# Patient Record
Sex: FEMALE
[De-identification: names, ages, dates, MRNs, and addresses within clinical notes are randomized per-mention and may not be internally consistent; named-entity substitution may affect disease eponyms.]

---

## 2019-03-19 ENCOUNTER — TRANSCRIPTION ENCOUNTER (OUTPATIENT)
Age: 64
End: 2019-03-19

## 2019-05-29 PROBLEM — Z00.00 ENCOUNTER FOR PREVENTIVE HEALTH EXAMINATION: Status: ACTIVE | Noted: 2019-05-29

## 2019-05-30 ENCOUNTER — APPOINTMENT (OUTPATIENT)
Dept: OTOLARYNGOLOGY | Facility: CLINIC | Age: 64
End: 2019-05-30
Payer: COMMERCIAL

## 2019-05-30 VITALS
HEART RATE: 74 BPM | BODY MASS INDEX: 23.04 KG/M2 | HEIGHT: 63 IN | SYSTOLIC BLOOD PRESSURE: 108 MMHG | DIASTOLIC BLOOD PRESSURE: 56 MMHG | WEIGHT: 130 LBS

## 2019-05-30 DIAGNOSIS — Z82.49 FAMILY HISTORY OF ISCHEMIC HEART DISEASE AND OTHER DISEASES OF THE CIRCULATORY SYSTEM: ICD-10-CM

## 2019-05-30 DIAGNOSIS — H61.21 IMPACTED CERUMEN, RIGHT EAR: ICD-10-CM

## 2019-05-30 DIAGNOSIS — H90.42 SENSORINEURAL HEARING LOSS, UNILATERAL, LEFT EAR, WITH UNRESTRICTED HEARING ON THE CONTRALATERAL SIDE: ICD-10-CM

## 2019-05-30 DIAGNOSIS — H90.3 SENSORINEURAL HEARING LOSS, BILATERAL: ICD-10-CM

## 2019-05-30 DIAGNOSIS — C50.919 MALIGNANT NEOPLASM OF UNSPECIFIED SITE OF UNSPECIFIED FEMALE BREAST: ICD-10-CM

## 2019-05-30 DIAGNOSIS — R42 DIZZINESS AND GIDDINESS: ICD-10-CM

## 2019-05-30 DIAGNOSIS — E78.5 HYPERLIPIDEMIA, UNSPECIFIED: ICD-10-CM

## 2019-05-30 PROCEDURE — 92567 TYMPANOMETRY: CPT

## 2019-05-30 PROCEDURE — 99203 OFFICE O/P NEW LOW 30 MIN: CPT | Mod: 25

## 2019-05-30 PROCEDURE — 92557 COMPREHENSIVE HEARING TEST: CPT

## 2019-05-30 PROCEDURE — 69220 CLEAN OUT MASTOID CAVITY: CPT

## 2019-05-30 RX ORDER — NAPROXEN SODIUM 220 MG
TABLET ORAL
Refills: 0 | Status: ACTIVE | COMMUNITY

## 2019-05-30 RX ORDER — ACETAMINOPHEN 325 MG
TABLET ORAL
Refills: 0 | Status: ACTIVE | COMMUNITY

## 2019-05-30 NOTE — HISTORY OF PRESENT ILLNESS
[de-identified] : EMA REIS is a 63 year patient with a 2 month history of dizziness. She has disequilibrium and lightheadedness. The episodes are constant but can wax and wane. They occur every other day and last minutes to hours. They are not positional. She denies other ear symptoms such as tinnitus, otalgia, or otorrhea. She does have an itchy ears. She saw her eye doctor yesterday. She may have cervical disc disease. \par \par she denies ear pressure, aural fullness, otalgia, otorrhea, tinnitus.\par \par she denies palpitations, shortness of breath, visual changes, neurological symptoms. She occasionally has headaches which may be associated with the dizziness. \par   \par History of recurrent ear infections- as a child\par Prior ear surgery- no\par History of otologic trauma-  no\par Noise exposure- no\par Family history of hearing loss-  age related\par Prior audiogram- 2014- slight hearing loss at 8000 Hz\par \par Dizziness questionnaire was reviewed. \par \par

## 2019-06-25 PROBLEM — H90.3 BILATERAL HIGH FREQUENCY SENSORINEURAL HEARING LOSS: Status: ACTIVE | Noted: 2019-05-30

## 2020-08-07 ENCOUNTER — TRANSCRIPTION ENCOUNTER (OUTPATIENT)
Age: 65
End: 2020-08-07

## 2021-06-10 NOTE — ASSESSMENT
Will forward to Dr. Jones to review and provider letter if appropriate.    Jarad Alcocer RN  Patient Care Supervisor   [facility] [place]      [FreeTextEntry1] : She has a 2 month history of dizziness. She does not have vertigo. She has disequilibrium and lightheadedness. It is not positional. She had cerumen impaction which was removed. Her ears were otherwise normal on exam. Audiogram showed a bilateral high-frequency sensorineural hearing loss with mild asymmetry left ear. We discussed possible etiologies for her symptoms.\par \par PLAN\par \par -findings and management options discussed in detail with the patient. \par -good aural hygiene\par -avoid using cotton swabs in the ears\par -noise precautions\par -annual audiogram\par -low salt diet and avoidance of caffeine, alcohol and nicotine may help some forms of inner ear related dizziness\par -meclizine prn severe vertigo\par -she will see her PCP for evaluation. She may also benefit from neurology evaluation \par -we discussed obtaining an MRI of the IACs or specialized inner ear testing. she is going to go for the MRI to rule out retrocochlear pathology \par -vestibular therapy and/or home exercises\par -follow up after the MRI \par -call and return earlier if any concerns. \par \par

## 2022-10-11 NOTE — CONSULT LETTER
[Time Spent: ___ minutes] : I have spent [unfilled] minutes of time on the encounter. [Dear  ___] : Dear  [unfilled], [Consult Letter:] : I had the pleasure of evaluating your patient, [unfilled]. [Please see my note below.] : Please see my note below. [Consult Closing:] : Thank you very much for allowing me to participate in the care of this patient.  If you have any questions, please do not hesitate to contact me. [Sincerely,] : Sincerely, [FreeTextEntry3] : Melodie Alfaro MD\par

## 2023-02-05 ENCOUNTER — NON-APPOINTMENT (OUTPATIENT)
Age: 68
End: 2023-02-05